# Patient Record
Sex: FEMALE | Race: WHITE | NOT HISPANIC OR LATINO | Employment: UNEMPLOYED | URBAN - METROPOLITAN AREA
[De-identification: names, ages, dates, MRNs, and addresses within clinical notes are randomized per-mention and may not be internally consistent; named-entity substitution may affect disease eponyms.]

---

## 2022-12-29 ENCOUNTER — HOSPITAL ENCOUNTER (EMERGENCY)
Facility: CLINIC | Age: 32
Discharge: HOME | End: 2022-12-30
Attending: EMERGENCY MEDICINE | Admitting: EMERGENCY MEDICINE
Payer: COMMERCIAL

## 2022-12-29 DIAGNOSIS — R19.7 VOMITING AND DIARRHEA: ICD-10-CM

## 2022-12-29 DIAGNOSIS — Z34.91 FIRST TRIMESTER PREGNANCY: ICD-10-CM

## 2022-12-29 DIAGNOSIS — B34.9 VIRAL SYNDROME: ICD-10-CM

## 2022-12-29 DIAGNOSIS — R05.1 ACUTE COUGH: ICD-10-CM

## 2022-12-29 DIAGNOSIS — R11.10 VOMITING AND DIARRHEA: ICD-10-CM

## 2022-12-29 LAB
ALBUMIN SERPL-MCNC: 3 G/DL (ref 3.4–5)
ALP SERPL-CCNC: 37 U/L (ref 40–150)
ALT SERPL W P-5'-P-CCNC: 14 U/L (ref 0–50)
ANION GAP SERPL CALCULATED.3IONS-SCNC: 9 MMOL/L (ref 3–14)
AST SERPL W P-5'-P-CCNC: 10 U/L (ref 0–45)
BASOPHILS # BLD AUTO: 0 10E3/UL (ref 0–0.2)
BASOPHILS NFR BLD AUTO: 0 %
BILIRUB SERPL-MCNC: 0.5 MG/DL (ref 0.2–1.3)
BUN SERPL-MCNC: 9 MG/DL (ref 7–30)
CALCIUM SERPL-MCNC: 8.5 MG/DL (ref 8.5–10.1)
CHLORIDE BLD-SCNC: 103 MMOL/L (ref 94–109)
CO2 SERPL-SCNC: 21 MMOL/L (ref 20–32)
CREAT SERPL-MCNC: 0.41 MG/DL (ref 0.52–1.04)
EOSINOPHIL # BLD AUTO: 0 10E3/UL (ref 0–0.7)
EOSINOPHIL NFR BLD AUTO: 0 %
ERYTHROCYTE [DISTWIDTH] IN BLOOD BY AUTOMATED COUNT: 12.2 % (ref 10–15)
FLUAV RNA SPEC QL NAA+PROBE: NEGATIVE
FLUBV RNA RESP QL NAA+PROBE: NEGATIVE
GFR SERPL CREATININE-BSD FRML MDRD: >90 ML/MIN/1.73M2
GLUCOSE BLD-MCNC: 111 MG/DL (ref 70–99)
HCT VFR BLD AUTO: 32.1 % (ref 35–47)
HGB BLD-MCNC: 10.9 G/DL (ref 11.7–15.7)
HOLD SPECIMEN: NORMAL
IMM GRANULOCYTES # BLD: 0 10E3/UL
IMM GRANULOCYTES NFR BLD: 1 %
LIPASE SERPL-CCNC: 101 U/L (ref 73–393)
LYMPHOCYTES # BLD AUTO: 0.3 10E3/UL (ref 0.8–5.3)
LYMPHOCYTES NFR BLD AUTO: 4 %
MCH RBC QN AUTO: 32.8 PG (ref 26.5–33)
MCHC RBC AUTO-ENTMCNC: 34 G/DL (ref 31.5–36.5)
MCV RBC AUTO: 97 FL (ref 78–100)
MONOCYTES # BLD AUTO: 0.2 10E3/UL (ref 0–1.3)
MONOCYTES NFR BLD AUTO: 3 %
NEUTROPHILS # BLD AUTO: 6.1 10E3/UL (ref 1.6–8.3)
NEUTROPHILS NFR BLD AUTO: 92 %
NRBC # BLD AUTO: 0 10E3/UL
NRBC BLD AUTO-RTO: 0 /100
PLATELET # BLD AUTO: 157 10E3/UL (ref 150–450)
POTASSIUM BLD-SCNC: 3.6 MMOL/L (ref 3.4–5.3)
PROT SERPL-MCNC: 6.9 G/DL (ref 6.8–8.8)
RBC # BLD AUTO: 3.32 10E6/UL (ref 3.8–5.2)
RSV RNA SPEC NAA+PROBE: NEGATIVE
SARS-COV-2 RNA RESP QL NAA+PROBE: NEGATIVE
SODIUM SERPL-SCNC: 133 MMOL/L (ref 133–144)
WBC # BLD AUTO: 6.6 10E3/UL (ref 4–11)

## 2022-12-29 PROCEDURE — 99285 EMERGENCY DEPT VISIT HI MDM: CPT | Mod: CS,25

## 2022-12-29 PROCEDURE — 258N000003 HC RX IP 258 OP 636: Performed by: EMERGENCY MEDICINE

## 2022-12-29 PROCEDURE — 36415 COLL VENOUS BLD VENIPUNCTURE: CPT | Performed by: EMERGENCY MEDICINE

## 2022-12-29 PROCEDURE — 83690 ASSAY OF LIPASE: CPT | Performed by: EMERGENCY MEDICINE

## 2022-12-29 PROCEDURE — 85025 COMPLETE CBC W/AUTO DIFF WBC: CPT | Performed by: EMERGENCY MEDICINE

## 2022-12-29 PROCEDURE — C9803 HOPD COVID-19 SPEC COLLECT: HCPCS

## 2022-12-29 PROCEDURE — 80053 COMPREHEN METABOLIC PANEL: CPT | Performed by: EMERGENCY MEDICINE

## 2022-12-29 PROCEDURE — 96361 HYDRATE IV INFUSION ADD-ON: CPT

## 2022-12-29 PROCEDURE — 87637 SARSCOV2&INF A&B&RSV AMP PRB: CPT | Performed by: EMERGENCY MEDICINE

## 2022-12-29 RX ORDER — METOCLOPRAMIDE HYDROCHLORIDE 5 MG/ML
5 INJECTION INTRAMUSCULAR; INTRAVENOUS ONCE
Status: COMPLETED | OUTPATIENT
Start: 2022-12-29 | End: 2022-12-30

## 2022-12-29 RX ADMIN — SODIUM CHLORIDE 1000 ML: 9 INJECTION, SOLUTION INTRAVENOUS at 22:34

## 2022-12-29 ASSESSMENT — ACTIVITIES OF DAILY LIVING (ADL): ADLS_ACUITY_SCORE: 33

## 2022-12-30 VITALS
RESPIRATION RATE: 14 BRPM | DIASTOLIC BLOOD PRESSURE: 54 MMHG | SYSTOLIC BLOOD PRESSURE: 90 MMHG | TEMPERATURE: 98.5 F | OXYGEN SATURATION: 95 % | HEART RATE: 94 BPM

## 2022-12-30 PROCEDURE — 96361 HYDRATE IV INFUSION ADD-ON: CPT

## 2022-12-30 PROCEDURE — 96374 THER/PROPH/DIAG INJ IV PUSH: CPT

## 2022-12-30 PROCEDURE — 258N000003 HC RX IP 258 OP 636: Performed by: EMERGENCY MEDICINE

## 2022-12-30 PROCEDURE — 250N000011 HC RX IP 250 OP 636: Performed by: EMERGENCY MEDICINE

## 2022-12-30 RX ORDER — METOCLOPRAMIDE 5 MG/1
5 TABLET ORAL 3 TIMES DAILY PRN
Qty: 15 TABLET | Refills: 0 | Status: SHIPPED | OUTPATIENT
Start: 2022-12-30

## 2022-12-30 RX ORDER — PYRIDOXINE HCL (VITAMIN B6) 25 MG
25 TABLET ORAL EVERY 8 HOURS PRN
Qty: 15 TABLET | Refills: 0 | Status: SHIPPED | OUTPATIENT
Start: 2022-12-30

## 2022-12-30 RX ADMIN — SODIUM CHLORIDE 1000 ML: 9 INJECTION, SOLUTION INTRAVENOUS at 00:15

## 2022-12-30 RX ADMIN — METOCLOPRAMIDE 5 MG: 5 INJECTION, SOLUTION INTRAMUSCULAR; INTRAVENOUS at 00:15

## 2022-12-30 ASSESSMENT — ACTIVITIES OF DAILY LIVING (ADL): ADLS_ACUITY_SCORE: 35

## 2022-12-30 NOTE — ED PROVIDER NOTES
History   Chief Complaint:  Cough; Flu Symptoms; and Nausea, Vomiting, & Diarrhea     HPI History supplemented by electronic chart review and boyfriend at bedside    Steffi Rm is a 11 week pregnant 32 year old female who presents with cough, vomiting, and diarrhea. The patient states that two days ago after a family gathering she began to develop a cough and had some yellow sputum with rhinorrhea. She has also been running a 100.4F fever. Along with these symptoms she has been having nausea, vomiting, and diarrhea, all nonbloody. She has been having multiple episodes of diarrhea and vomiting each day. She denies any vaginal bleeding recently. She had a normal ultrasound 2 weeks ago but states that she would like a ultrasound tonight to make sure that the baby is still ok.  No focal abdominal pain.  She has not taken any antipyretics or analgesics at home.    Review of Systems   All other systems reviewed and are negative.    Allergies:  Cetirizine  Metronidazole  Penicillins    Medications:  Colace   Zofran    Past Medical History:     Thrombocytopenia  Anemia  Heart murmur  Ovarian cyst  PTSD  Bipolar     Family History:    Mother-Asthma    Social History:  The patient presents to the ED with her partner.    Physical Exam     Patient Vitals for the past 24 hrs:   BP Temp Temp src Pulse Resp SpO2   12/30/22 0018 90/54 98.5  F (36.9  C) Oral 94 14 95 %   12/29/22 2215 97/57 99.5  F (37.5  C) Oral 109 18 100 %     Physical Exam  Gen: Nontoxic-appearing woman recumbent in room 23, boyfriend at bedside  HENT: mucous membranes somewhat dry, L TM wnl, R TM wnl, mastoids nontender, OP clear without swelling or exudate  Eyes: pupils normal, no scleral injection  CV: regular rhythm, cap refill normal  Resp: normal effort, speaks in full phrases, no stridor, occasional dry cough observed  GI: abdomen soft and nontender, no guarding, uterine fundus not palpable  MSK: no bony tenderness  Skin: appropriately warm and dry,  no ecchymosis, no petechiae  Neuro: awake, alert, normal tone in extremities, no meningismus  Psych: cooperative, no apparent hallucinations    Emergency Department Course     Imaging:  POC US OB TRANSABDOMINAL LIMITED   Final Result   ED Bedside Limited Ultrasound   Body area scanned: uterus/OB limited   Performed by: Edmond Naqvi MD   Indication: abd symptoms, 1st trim pregnancy   Findings/Interpretation: single live IUP, frequent movement,    Key images were digitally archived in the [facility] radiology system.              Report per radiology    Laboratory:  Labs Ordered and Resulted from Time of ED Arrival to Time of ED Departure   COMPREHENSIVE METABOLIC PANEL - Abnormal       Result Value    Sodium 133      Potassium 3.6      Chloride 103      Carbon Dioxide (CO2) 21      Anion Gap 9      Urea Nitrogen 9      Creatinine 0.41 (*)     Calcium 8.5      Glucose 111 (*)     Alkaline Phosphatase 37 (*)     AST 10      ALT 14      Protein Total 6.9      Albumin 3.0 (*)     Bilirubin Total 0.5      GFR Estimate >90     CBC WITH PLATELETS AND DIFFERENTIAL - Abnormal    WBC Count 6.6      RBC Count 3.32 (*)     Hemoglobin 10.9 (*)     Hematocrit 32.1 (*)     MCV 97      MCH 32.8      MCHC 34.0      RDW 12.2      Platelet Count 157      % Neutrophils 92      % Lymphocytes 4      % Monocytes 3      % Eosinophils 0      % Basophils 0      % Immature Granulocytes 1      NRBCs per 100 WBC 0      Absolute Neutrophils 6.1      Absolute Lymphocytes 0.3 (*)     Absolute Monocytes 0.2      Absolute Eosinophils 0.0      Absolute Basophils 0.0      Absolute Immature Granulocytes 0.0      Absolute NRBCs 0.0     INFLUENZA A/B & SARS-COV2 PCR MULTIPLEX - Normal    Influenza A PCR Negative      Influenza B PCR Negative      RSV PCR Negative      SARS CoV2 PCR Negative     LIPASE - Normal    Lipase 101        Emergency Department Course:       Reviewed:  I reviewed nursing notes, vitals, past medical history and Care  Everywhere    Assessments:  2320 I obtained history and examined the patient as noted above.   2348 I rechecked the patient and explained findings.   0052 I rechecked the patient.    Interventions:  2234 NS Bolus 1000mL IV  0015 NS Bolus 1000mL IV  0015 Reglan 5mg IV    Disposition:  The patient was discharged to home.     Impression & Plan     Medical Decision Making:  Her constellation of symptoms is highly suggestive of a viral syndrome, though I also considered alternate etiologies including bacterial pneumonia, colitis, obstetric complication, bloodstream infection and many others.  She has no hypoxia and her work of breathing is normal, lung sounds normal, no indication for emergent chest imaging which I felt was most appropriate to defer especially light of her pregnancy.  Regarding her pregnancy, this was certainly considered in all aspects of her care though at this time there are no signs of a direct obstetric issue, and I performed a bedside ultrasound confirming a live single intrauterine pregnancy, which provided her with significant reassurance.  She is tolerating oral intake and feels improved such that the patient and I readily agreed on a plan for ongoing outpatient management with close follow-up through OB, return here for sudden worsening at any hour.  Do not think that antibiotics or antivirals are indicated.  Antiemetics were prescribed.  Discharged home in improved condition    Diagnosis:    ICD-10-CM    1. Vomiting and diarrhea  R11.10     R19.7       2. Acute cough  R05.1       3. Viral syndrome  B34.9       4. First trimester pregnancy  Z34.91           Discharge Medications:  Discharge Medication List as of 12/30/2022 12:58 AM      START taking these medications    Details   doxylamine (UNISOM) 25 MG TABS tablet Take 1 tablet (25 mg) by mouth every 8 hours as needed (nausea/vomiting), Disp-15 tablet, R-0, E-Prescribe      metoclopramide (REGLAN) 5 MG tablet Take 1 tablet (5 mg) by mouth 3  times daily as needed (nausea/vomiting) As needed for nausea/vomiting, Disp-15 tablet, R-0, E-Prescribe      pyridOXINE (VITAMIN B6) 25 MG tablet Take 1 tablet (25 mg) by mouth every 8 hours as needed (nausea/vomiting) As needed for nausea/vomiting, Disp-15 tablet, R-0, E-Prescribe             Scribe Disclosure:  I, Onesimo Lujan, am serving as a scribe at 11:21 PM on 12/29/2022 to document services personally performed by Edmond Naqvi MD based on my observations and the provider's statements to me.        Edmond Naqvi MD  12/30/22 0626

## 2022-12-30 NOTE — ED TRIAGE NOTES
Pt C/O of flu s/s with abd pain that started on tues and has progressively got worse. Pt is also 11 weeks pregnant       Triage Assessment     Row Name 12/29/22 4946       Triage Assessment (Adult)    Airway WDL WDL       Respiratory WDL    Respiratory WDL X;cough    Cough Frequency infrequent       Skin Circulation/Temperature WDL    Skin Circulation/Temperature WDL WDL       Cardiac WDL    Cardiac WDL WDL       Peripheral/Neurovascular WDL    Peripheral Neurovascular WDL WDL       Cognitive/Neuro/Behavioral WDL    Cognitive/Neuro/Behavioral WDL WDL